# Patient Record
Sex: FEMALE | Race: BLACK OR AFRICAN AMERICAN | NOT HISPANIC OR LATINO | Employment: OTHER | URBAN - METROPOLITAN AREA
[De-identification: names, ages, dates, MRNs, and addresses within clinical notes are randomized per-mention and may not be internally consistent; named-entity substitution may affect disease eponyms.]

---

## 2017-04-26 ENCOUNTER — OFFICE VISIT (OUTPATIENT)
Dept: OPHTHALMOLOGY | Facility: CLINIC | Age: 62
End: 2017-04-26
Payer: COMMERCIAL

## 2017-04-26 DIAGNOSIS — H40.1131 PRIMARY OPEN ANGLE GLAUCOMA OF BOTH EYES, MILD STAGE: Primary | ICD-10-CM

## 2017-04-26 DIAGNOSIS — H25.13 NUCLEAR SCLEROSIS, BILATERAL: ICD-10-CM

## 2017-04-26 DIAGNOSIS — H52.7 REFRACTIVE ERROR: ICD-10-CM

## 2017-04-26 PROCEDURE — 92250 FUNDUS PHOTOGRAPHY W/I&R: CPT | Mod: S$GLB,,, | Performed by: OPHTHALMOLOGY

## 2017-04-26 PROCEDURE — 92014 COMPRE OPH EXAM EST PT 1/>: CPT | Mod: S$GLB,,, | Performed by: OPHTHALMOLOGY

## 2017-04-26 PROCEDURE — 99999 PR PBB SHADOW E&M-EST. PATIENT-LVL II: CPT | Mod: PBBFAC,,, | Performed by: OPHTHALMOLOGY

## 2017-04-26 PROCEDURE — 92083 EXTENDED VISUAL FIELD XM: CPT | Mod: S$GLB,,, | Performed by: OPHTHALMOLOGY

## 2017-04-26 PROCEDURE — 92015 DETERMINE REFRACTIVE STATE: CPT | Mod: S$GLB,,, | Performed by: OPHTHALMOLOGY

## 2017-04-26 RX ORDER — ASPIRIN 81 MG/1
81 TABLET ORAL DAILY
COMMUNITY

## 2017-04-26 NOTE — MR AVS SNAPSHOT
[facility]  [address] [facility] [place] [address]  Phone: [phone]  Fax: [phone]                  Len Hicks   2017 1:15 PM   Office Visit    Description:  Female : 1955   Provider:  Gian Page MD   Department:  [facility] - Ophthalmology           Reason for Visit     Glaucoma           Diagnoses this Visit        Comments    Primary open angle glaucoma of both eyes, mild stage    -  Primary     Nuclear sclerosis, bilateral         Refractive error                To Do List           Goals (5 Years of Data)     None      [facility]     [facility] -  Assistance  Unless otherwise directed by your provider, please contact [facility], our nurse care line that is available for  assistance.     Registered nurses in the [facility] Center provide: appointment scheduling, clinical advisement, health education, and other advisory services.  Call: [phone] (toll free)               Medications           Message regarding Medications     Verify the changes and/or additions to your medication regime listed below are the same as discussed with your clinician today.  If any of these changes or additions are incorrect, please notify your healthcare provider.             Verify that the below list of medications is an accurate representation of the medications you are currently taking.  If none reported, the list may be blank. If incorrect, please contact your healthcare provider. Carry this list with you in case of emergency.           Current Medications     amlodipine (NORVASC) 10 MG tablet     aspirin (ECOTRIN) 81 MG EC tablet Take 81 mg by mouth once daily.    CALCIUM ACETATE ORAL Take by mouth.    clopidogrel (PLAVIX) 75 mg tablet TK 1 T PO  D    CRESTOR 20 mg tablet     LUMIGAN 0.01 % Drop INSTILL 1 DROP IN OU HS    metoprolol tartrate (LOPRESSOR) 25 MG tablet TK 1 T PO  BID    timolol maleate 0.5% (TIMOPTIC) 0.5 % Drop  INSTILL 1 DROP INTO EACH EYE EVERY MORNING    tramadol (ULTRAM) 50 mg tablet TK 1 T PO  Q 6 TO 8 H PRN    valsartan-hydrochlorothiazide (DIOVAN-HCT) 320-12.5 mg per tablet TK 1 T PO D    zolpidem (AMBIEN) 10 mg Tab            Clinical Reference Information           Allergies as of 4/26/2017     Allegra-d 12 Hour [Fexofenadine-pseudoephedrine]    Augmentin [Amoxicillin-pot Clavulanate]    Robitussin [Guaifenesin]      Immunizations Administered on Date of Encounter - 4/26/2017     None      Orders Placed During Today's Visit      Normal Orders This Visit    Color Fundus Photography - OU - Both Eyes     Hawley Visual Field - OU - Extended - Both Eyes       [facility] Sign-Up     Activating your [facility] account is as easy as 1-2-3!     1) Visit [email or web address], select Sign Up Now, enter this activation code and your date of birth, then select Next.  [email or web address]  Expires: 6/10/2017  2:24 PM      2) Create a username and password to use when you visit [facility] in the future and select a security question in case you lose your password and select Next.    3) Enter your e-mail address and click Sign Up!    Additional Information  If you have questions, please e-mail [email or web address] or call [phone] to talk to our [facility] staff. Remember, [facility] is NOT to be used for urgent needs. For medical emergencies, dial 911.         Language Assistance Services     ATTENTION: Language assistance services are available, free of charge. Please call [phone].      ATENCIÓN: Si habla español, tiene a chino disposición servicios gratuitos de asistencia lingüística. Llame al [phone].     CHÚ Ý: N?u b?n nói Ti?ng Vi?t, có các d?ch v? h? tr? ngôn ng? mi?n phí dành cho b?n. G?i s? [phone].         [facility] complies with applicable Federal civil rights laws and does not discriminate on the basis of race, color, national origin, age, disability, or sex.

## 2017-04-26 NOTE — PROGRESS NOTES
SUBJECTIVE:   Len Hicks is a 61 y.o. female   Corrected distance visual acuity was 20/40 +2 in the right eye and 20/25 -2 in the left eye.   Chief Complaint   Patient presents with    Glaucoma     6 month lost to follow up, Timolol QAM OU, Lumigan QHS OU        HPI:  HPI     Glaucoma    Additional comments: 6 month lost to follow up, Timolol QAM OU, Lumigan   QHS OU           Comments   Pt states she's been having trouble seeing clearly at night, glare bothers   her. Want to update her glasses, the frames are too small for the PAL,   hard to read with the glasses. Using her drops as directed. No ocular pain   or irritation.     1. Mild Coag (Initial 24/22) goal = 16  SLT OD 1/05 (20-17)  2. NSC OU  3. Dry Eyes    Timolol QAM OU  Lumigan QHS OU       Last edited by Momo Mckeon on 4/26/2017  1:40 PM. (History)        Assessment /Plan :  1. Primary open angle glaucoma of both eyes, mild stage Doing well, IOP within acceptable range relative to target IOP and no evidence of progression. Continue current treatment. Reviewed importance of continued compliance with treatment and follow up.     2. Nuclear sclerosis, bilateral monitor for now   3.      Refractive Error PAL Rx    Return to clinic in 4 months  or as needed.  With IOP Check and GOCT

## 2017-07-09 DIAGNOSIS — H40.1194 INDETERMINATE STAGE CHRONIC OPEN ANGLE GLAUCOMA: ICD-10-CM

## 2017-07-10 RX ORDER — TIMOLOL MALEATE 5 MG/ML
SOLUTION/ DROPS OPHTHALMIC
Qty: 5 ML | Refills: 12 | Status: SHIPPED | OUTPATIENT
Start: 2017-07-10 | End: 2019-05-20 | Stop reason: SDUPTHER

## 2017-10-02 ENCOUNTER — OFFICE VISIT (OUTPATIENT)
Dept: OPHTHALMOLOGY | Facility: CLINIC | Age: 62
End: 2017-10-02
Payer: COMMERCIAL

## 2017-10-02 DIAGNOSIS — H40.1131 PRIMARY OPEN ANGLE GLAUCOMA OF BOTH EYES, MILD STAGE: Primary | ICD-10-CM

## 2017-10-02 DIAGNOSIS — H25.13 NUCLEAR SCLEROSIS, BILATERAL: ICD-10-CM

## 2017-10-02 PROCEDURE — 99999 PR PBB SHADOW E&M-EST. PATIENT-LVL I: CPT | Mod: PBBFAC,,, | Performed by: OPHTHALMOLOGY

## 2017-10-02 PROCEDURE — 92133 CPTRZD OPH DX IMG PST SGM ON: CPT | Mod: S$GLB,,, | Performed by: OPHTHALMOLOGY

## 2017-10-02 PROCEDURE — 92012 INTRM OPH EXAM EST PATIENT: CPT | Mod: S$GLB,,, | Performed by: OPHTHALMOLOGY

## 2017-10-02 NOTE — PROGRESS NOTES
SUBJECTIVE:   Len Hicks is a 62 y.o. female   Corrected distance visual acuity was 20/20 in the right eye and 20/20 in the left eye.   Chief Complaint   Patient presents with    Glaucoma     4m IOP GOCT chk; Lumigan QHS OU, Timolol BID OU        HPI:  HPI     Glaucoma    Additional comments: 4m IOP GOCT chk; Lumigan QHS OU, Timolol BID OU           Comments   Pt states no pain or discomfort. VA stable. 95% compliant with gtts.     1. Mild Coag (Initial 24/22) goal = 16  SLT OD 1/05 (20-17)  2. NSC OU  3. Dry Eyes    Timolol QAM OU  Lumigan QHS OU       Last edited by Marcellus Garcia, Patient Care Assistant on 10/2/2017 11:16   AM. (History)        Assessment /Plan :  1. Primary open angle glaucoma of both eyes, mild stage IOP not within acceptable range relative to target IOP. Increases risk of irreversible visual loss due to inadequate compliance discussed. Lengthy discussion regarding importance of absolute compliance with treatment regimen and follow up. Discussed options, risks, and benefits of additional medication, SLT laser, and/ or incisional glaucoma surgery versus improved compliance.     Pt elects to work on improving medication compliance     2. Nuclear sclerosis, bilateral          Return to clinic in 4 months  or as needed.  With IOP Check

## 2017-10-11 RX ORDER — BIMATOPROST 0.1 MG/ML
SOLUTION/ DROPS OPHTHALMIC
Qty: 5 ML | Refills: 12 | Status: SHIPPED | OUTPATIENT
Start: 2017-10-11 | End: 2019-05-20 | Stop reason: SDUPTHER

## 2018-01-25 ENCOUNTER — TELEPHONE (OUTPATIENT)
Dept: ORTHOPEDICS | Facility: CLINIC | Age: 63
End: 2018-01-25

## 2018-02-26 ENCOUNTER — OFFICE VISIT (OUTPATIENT)
Dept: OPHTHALMOLOGY | Facility: CLINIC | Age: 63
End: 2018-02-26
Payer: COMMERCIAL

## 2018-02-26 DIAGNOSIS — H25.13 NUCLEAR SCLEROSIS, BILATERAL: ICD-10-CM

## 2018-02-26 DIAGNOSIS — H40.1131 PRIMARY OPEN ANGLE GLAUCOMA OF BOTH EYES, MILD STAGE: Primary | ICD-10-CM

## 2018-02-26 PROCEDURE — 99999 PR PBB SHADOW E&M-EST. PATIENT-LVL II: CPT | Mod: PBBFAC,,, | Performed by: OPHTHALMOLOGY

## 2018-02-26 PROCEDURE — 92012 INTRM OPH EXAM EST PATIENT: CPT | Mod: S$GLB,,, | Performed by: OPHTHALMOLOGY

## 2018-02-26 NOTE — PROGRESS NOTES
SUBJECTIVE:   Len Hicks is a 62 y.o. female   Corrected distance visual acuity was 20/20 -1 in the right eye and 20/25 -2 in the left eye.   Chief Complaint   Patient presents with    Glaucoma     4 mth iop check        HPI:  HPI     Glaucoma    Additional comments: 4 mth iop check           Comments   1. Mild Coag (Initial 24/22) goal = 16  SLT OD 1/05 (20-17)  2. NSC OU  3. Dry Eyes    Timolol QAM OU  Lumigan QHS OU       Last edited by Cassandra Kumar MA on 2/26/2018 11:05 AM. (History)        Assessment /Plan :  1. Primary open angle glaucoma of both eyes, mild stage Doing well, IOP within acceptable range relative to target IOP and no evidence of progression. Continue current treatment. Reviewed importance of continued compliance with treatment and follow up.     2. Nuclear sclerosis, bilateral  - not visually significant. Observe.       Return to clinic in 4 months  or as needed.  With 24-2 HVF, Dilation and SDP's

## 2018-08-16 ENCOUNTER — OFFICE VISIT (OUTPATIENT)
Dept: OPHTHALMOLOGY | Facility: CLINIC | Age: 63
End: 2018-08-16
Payer: COMMERCIAL

## 2018-08-16 DIAGNOSIS — H40.1131 PRIMARY OPEN ANGLE GLAUCOMA (POAG) OF BOTH EYES, MILD STAGE: Primary | ICD-10-CM

## 2018-08-16 DIAGNOSIS — H25.13 NUCLEAR SCLEROSIS, BILATERAL: ICD-10-CM

## 2018-08-16 PROCEDURE — 92014 COMPRE OPH EXAM EST PT 1/>: CPT | Mod: S$GLB,,, | Performed by: OPHTHALMOLOGY

## 2018-08-16 PROCEDURE — 99999 PR PBB SHADOW E&M-EST. PATIENT-LVL II: CPT | Mod: PBBFAC,,, | Performed by: OPHTHALMOLOGY

## 2018-08-16 PROCEDURE — 92083 EXTENDED VISUAL FIELD XM: CPT | Mod: S$GLB,,, | Performed by: OPHTHALMOLOGY

## 2018-08-16 PROCEDURE — 92250 FUNDUS PHOTOGRAPHY W/I&R: CPT | Mod: S$GLB,,, | Performed by: OPHTHALMOLOGY

## 2018-08-16 NOTE — PROGRESS NOTES
SUBJECTIVE:   Len Hicks is a 63 y.o. female   Corrected distance visual acuity was 20/25 in the right eye and 20/20 in the left eye.   Chief Complaint   Patient presents with    Glaucoma     4m HVF SDP chk        HPI:  HPI     Glaucoma      Additional comments: 4m HVF SDP chk              Comments     Pt here for 4m HVF SDP chk. No pain or discomfort. VA stable. Pt states   that she is a lot more compliant with the Timolol than the Lumigan. She   thinks she is about 50% compliant with the Lumigan.      1. Mild Coag (Initial 24/22) goal = 16  SLT OD 1/05 (20-17)  2. NSC OU  3. Dry Eyes    Timolol QAM OU  Lumigan QHS OU          Last edited by Marcellus Garcia, Patient Care Assistant on 8/16/2018  1:41   PM. (History)        Assessment /Plan :  1. Primary open angle glaucoma (POAG) of both eyes, mild stage IOP not within acceptable range relative to target IOP with risk of irreversible visual loss. Better IOP control is recommended. Discussed options, risks, and benefits of additional medication, SLT laser, and/or incisional glaucoma surgery. Reviewed importance of continued compliance with treatment and follow up.     Patient chooses work on improving medication compliance and defer and recheck IOP next visit  If IOP is still increased may consider the SLT   2. Nuclear sclerosis, bilateral  - not visually significant. Observe.       Return to clinic in 3-4 months  or as needed.  With IOP Check and GOCT

## 2019-05-20 ENCOUNTER — OFFICE VISIT (OUTPATIENT)
Dept: OPHTHALMOLOGY | Facility: CLINIC | Age: 64
End: 2019-05-20
Payer: COMMERCIAL

## 2019-05-20 DIAGNOSIS — H40.1194 INDETERMINATE STAGE CHRONIC OPEN ANGLE GLAUCOMA: ICD-10-CM

## 2019-05-20 DIAGNOSIS — H40.1131 PRIMARY OPEN ANGLE GLAUCOMA (POAG) OF BOTH EYES, MILD STAGE: Primary | ICD-10-CM

## 2019-05-20 PROCEDURE — 92012 PR EYE EXAM, EST PATIENT,INTERMED: ICD-10-PCS | Mod: S$GLB,,, | Performed by: OPHTHALMOLOGY

## 2019-05-20 PROCEDURE — 92133 CPTRZD OPH DX IMG PST SGM ON: CPT | Mod: S$GLB,,, | Performed by: OPHTHALMOLOGY

## 2019-05-20 PROCEDURE — 92133 POSTERIOR SEGMENT OCT OPTIC NERVE(OCULAR COHERENCE TOMOGRAPHY) - OU - BOTH EYES: ICD-10-PCS | Mod: S$GLB,,, | Performed by: OPHTHALMOLOGY

## 2019-05-20 PROCEDURE — 99999 PR PBB SHADOW E&M-EST. PATIENT-LVL I: ICD-10-PCS | Mod: PBBFAC,,, | Performed by: OPHTHALMOLOGY

## 2019-05-20 PROCEDURE — 99999 PR PBB SHADOW E&M-EST. PATIENT-LVL I: CPT | Mod: PBBFAC,,, | Performed by: OPHTHALMOLOGY

## 2019-05-20 PROCEDURE — 92012 INTRM OPH EXAM EST PATIENT: CPT | Mod: S$GLB,,, | Performed by: OPHTHALMOLOGY

## 2019-05-20 RX ORDER — PHENTERMINE HYDROCHLORIDE 37.5 MG/1
TABLET ORAL
Refills: 2 | COMMUNITY
Start: 2019-05-09 | End: 2020-01-28

## 2019-05-20 RX ORDER — TOPIRAMATE 25 MG/1
TABLET ORAL
Refills: 2 | COMMUNITY
Start: 2019-05-09 | End: 2020-01-28

## 2019-05-20 NOTE — PROGRESS NOTES
SUBJECTIVE:   Len Hicks is a 63 y.o. female   Corrected distance visual acuity was 20/20 in the right eye and 20/25 in the left eye.   Chief Complaint   Patient presents with    Glaucoma        HPI:  HPI     Pt here for GOCT IOP chk. No pain or discomfort. VA stable. 100%   compliant with gtts. Pt needs refills on her gtts. Pt ran out of her   Lumigan within the last couple of months.     1. Mild Coag (Initial 24/22) goal = 16  SLT OD 1/05 (20-17)  2. NSC OU  3. Dry Eyes    Timolol QAM OU  Lumigan 0.03% QHS OU    Last edited by Marcellus Garcia, Patient Care Assistant on 5/20/2019  9:38   AM. (History)        Assessment /Plan :  1. Primary open angle glaucoma (POAG) of both eyes, mild stage - IOP not within acceptable range relative to target IOP. Increases risk of irreversible visual loss due to inadequate compliance discussed. Lengthy discussion regarding importance of absolute compliance with treatment regimen and follow up. Discussed options, risks, and benefits of additional medication, SLT laser, and/ or incisional glaucoma surgery versus improved compliance.     Pt elects to work on improving medication compliance     Resume Lumigan QHS OU  Continue Timolol QAM OU    Return to clinic in 6 weeks HVF and DOA  or as needed

## 2019-05-21 RX ORDER — TIMOLOL MALEATE 5 MG/ML
SOLUTION/ DROPS OPHTHALMIC
Qty: 5 ML | Refills: 12 | Status: SHIPPED | OUTPATIENT
Start: 2019-05-21 | End: 2020-05-26 | Stop reason: SDUPTHER

## 2019-09-16 ENCOUNTER — OFFICE VISIT (OUTPATIENT)
Dept: OPHTHALMOLOGY | Facility: CLINIC | Age: 64
End: 2019-09-16
Payer: COMMERCIAL

## 2019-09-16 DIAGNOSIS — H40.1131 PRIMARY OPEN ANGLE GLAUCOMA (POAG) OF BOTH EYES, MILD STAGE: Primary | ICD-10-CM

## 2019-09-16 DIAGNOSIS — H25.13 NUCLEAR SCLEROSIS, BILATERAL: ICD-10-CM

## 2019-09-16 PROCEDURE — 92014 PR EYE EXAM, EST PATIENT,COMPREHESV: ICD-10-PCS | Mod: S$GLB,,, | Performed by: OPHTHALMOLOGY

## 2019-09-16 PROCEDURE — 92083 EXTENDED VISUAL FIELD XM: CPT | Mod: S$GLB,,, | Performed by: OPHTHALMOLOGY

## 2019-09-16 PROCEDURE — 99999 PR PBB SHADOW E&M-EST. PATIENT-LVL II: CPT | Mod: PBBFAC,,, | Performed by: OPHTHALMOLOGY

## 2019-09-16 PROCEDURE — 92083 HUMPHREY VISUAL FIELD - OU - BOTH EYES: ICD-10-PCS | Mod: S$GLB,,, | Performed by: OPHTHALMOLOGY

## 2019-09-16 PROCEDURE — 92250 FUNDUS PHOTOGRAPHY W/I&R: CPT | Mod: S$GLB,,, | Performed by: OPHTHALMOLOGY

## 2019-09-16 PROCEDURE — 92014 COMPRE OPH EXAM EST PT 1/>: CPT | Mod: S$GLB,,, | Performed by: OPHTHALMOLOGY

## 2019-09-16 PROCEDURE — 92250 COLOR FUNDUS PHOTOGRAPHY - OU - BOTH EYES: ICD-10-PCS | Mod: S$GLB,,, | Performed by: OPHTHALMOLOGY

## 2019-09-16 PROCEDURE — 99999 PR PBB SHADOW E&M-EST. PATIENT-LVL II: ICD-10-PCS | Mod: PBBFAC,,, | Performed by: OPHTHALMOLOGY

## 2019-09-16 RX ORDER — IRBESARTAN AND HYDROCHLOROTHIAZIDE 300; 12.5 MG/1; MG/1
TABLET, FILM COATED ORAL
Refills: 11 | COMMUNITY
Start: 2019-06-10

## 2019-09-16 RX ORDER — NITROGLYCERIN 0.4 MG/1
TABLET SUBLINGUAL
Refills: 6 | COMMUNITY
Start: 2019-09-06 | End: 2020-03-05

## 2019-09-16 NOTE — PROGRESS NOTES
SUBJECTIVE:   Len Hicks is a 64 y.o. female   Corrected distance visual acuity was 20/20 in the right eye and 20/20 -1 in the left eye.   Chief Complaint   Patient presents with    Glaucoma     6 week HVF/FD, Timolol QAM OU, Lumigan 0.03 QHS OU        HPI:  HPI     Glaucoma      Additional comments: 6 week HVF/FD, Timolol QAM OU, Lumigan 0.03 QHS OU              Comments     Pt states she's been compliant with her drops. She's been having a FBS in   her left eye, feels like a lash is under the lid but nothing's there.   She's only using her glaucoma drops, no tears. No ocular pain or   irritation. Vision is about the same. Doing well with her glasses.     1. Mild Coag (Initial 24/22) goal = 16  SLT OD 1/05 (20-17)  2. NSC OU  3. Dry Eyes    Timolol QAM OU  Lumigan 0.03% QHS OU          Last edited by Momo Mckeon on 9/16/2019  1:09 PM. (History)        Assessment /Plan :  1. Primary open angle glaucoma (POAG) of both eyes, mild stage Doing well, IOP within acceptable range relative to target IOP and no evidence of progression. Continue current treatment. Reviewed importance of continued compliance with treatment and follow up.     2. Nuclear sclerosis, bilateral monitor for now     Return to clinic in 4 months  or as needed.  With IOP Check and GOCT

## 2020-05-26 DIAGNOSIS — H40.1194 INDETERMINATE STAGE CHRONIC OPEN ANGLE GLAUCOMA: ICD-10-CM

## 2020-05-26 RX ORDER — TIMOLOL MALEATE 5 MG/ML
SOLUTION/ DROPS OPHTHALMIC
Qty: 5 ML | Refills: 12 | Status: SHIPPED | OUTPATIENT
Start: 2020-05-26 | End: 2020-09-14

## 2021-09-01 ENCOUNTER — TELEPHONE (OUTPATIENT)
Dept: OPHTHALMOLOGY | Facility: CLINIC | Age: 66
End: 2021-09-01

## 2021-09-01 DIAGNOSIS — H40.1194 INDETERMINATE STAGE CHRONIC OPEN ANGLE GLAUCOMA: ICD-10-CM

## 2021-09-01 RX ORDER — TIMOLOL MALEATE 5 MG/ML
1 SOLUTION/ DROPS OPHTHALMIC EVERY MORNING
Qty: 5 ML | Refills: 1 | Status: SHIPPED | OUTPATIENT
Start: 2021-09-01 | End: 2022-03-22 | Stop reason: SDUPTHER

## 2021-10-04 ENCOUNTER — TELEPHONE (OUTPATIENT)
Dept: OPHTHALMOLOGY | Facility: CLINIC | Age: 66
End: 2021-10-04

## 2021-12-27 ENCOUNTER — TELEPHONE (OUTPATIENT)
Dept: OPHTHALMOLOGY | Facility: CLINIC | Age: 66
End: 2021-12-27
Payer: COMMERCIAL

## 2022-03-22 ENCOUNTER — OFFICE VISIT (OUTPATIENT)
Dept: OPHTHALMOLOGY | Facility: CLINIC | Age: 67
End: 2022-03-22
Payer: COMMERCIAL

## 2022-03-22 DIAGNOSIS — H40.1131 CHRONIC OPEN ANGLE GLAUCOMA OF BOTH EYES, MILD STAGE: Primary | ICD-10-CM

## 2022-03-22 DIAGNOSIS — H25.13 NUCLEAR SCLEROSIS OF BOTH EYES: ICD-10-CM

## 2022-03-22 PROCEDURE — 99999 PR PBB SHADOW E&M-EST. PATIENT-LVL III: CPT | Mod: PBBFAC,,, | Performed by: OPHTHALMOLOGY

## 2022-03-22 PROCEDURE — 92083 EXTENDED VISUAL FIELD XM: CPT | Mod: S$GLB,,, | Performed by: OPHTHALMOLOGY

## 2022-03-22 PROCEDURE — 92133 CPTRZD OPH DX IMG PST SGM ON: CPT | Mod: S$GLB,,, | Performed by: OPHTHALMOLOGY

## 2022-03-22 PROCEDURE — 99214 PR OFFICE/OUTPT VISIT, EST, LEVL IV, 30-39 MIN: ICD-10-PCS | Mod: S$GLB,,, | Performed by: OPHTHALMOLOGY

## 2022-03-22 PROCEDURE — 1159F MED LIST DOCD IN RCRD: CPT | Mod: CPTII,S$GLB,, | Performed by: OPHTHALMOLOGY

## 2022-03-22 PROCEDURE — 99214 OFFICE O/P EST MOD 30 MIN: CPT | Mod: S$GLB,,, | Performed by: OPHTHALMOLOGY

## 2022-03-22 PROCEDURE — 99999 PR PBB SHADOW E&M-EST. PATIENT-LVL III: ICD-10-PCS | Mod: PBBFAC,,, | Performed by: OPHTHALMOLOGY

## 2022-03-22 PROCEDURE — 1160F RVW MEDS BY RX/DR IN RCRD: CPT | Mod: CPTII,S$GLB,, | Performed by: OPHTHALMOLOGY

## 2022-03-22 PROCEDURE — 1160F PR REVIEW ALL MEDS BY PRESCRIBER/CLIN PHARMACIST DOCUMENTED: ICD-10-PCS | Mod: CPTII,S$GLB,, | Performed by: OPHTHALMOLOGY

## 2022-03-22 PROCEDURE — 92083 HUMPHREY VISUAL FIELD - OU - BOTH EYES: ICD-10-PCS | Mod: S$GLB,,, | Performed by: OPHTHALMOLOGY

## 2022-03-22 PROCEDURE — 1159F PR MEDICATION LIST DOCUMENTED IN MEDICAL RECORD: ICD-10-PCS | Mod: CPTII,S$GLB,, | Performed by: OPHTHALMOLOGY

## 2022-03-22 PROCEDURE — 92133 POSTERIOR SEGMENT OCT OPTIC NERVE(OCULAR COHERENCE TOMOGRAPHY) - OU - BOTH EYES: ICD-10-PCS | Mod: S$GLB,,, | Performed by: OPHTHALMOLOGY

## 2022-03-22 RX ORDER — SEMAGLUTIDE 1.34 MG/ML
INJECTION, SOLUTION SUBCUTANEOUS
COMMUNITY
Start: 2022-03-16

## 2022-03-22 RX ORDER — BIMATOPROST 0.3 MG/ML
1 SOLUTION/ DROPS OPHTHALMIC NIGHTLY
Qty: 2.5 ML | Refills: 12 | Status: SHIPPED | OUTPATIENT
Start: 2022-03-22 | End: 2023-02-14 | Stop reason: SDUPTHER

## 2022-03-22 RX ORDER — TIMOLOL MALEATE 5 MG/ML
1 SOLUTION/ DROPS OPHTHALMIC EVERY MORNING
Qty: 5 ML | Refills: 12 | Status: SHIPPED | OUTPATIENT
Start: 2022-03-22 | End: 2023-02-14 | Stop reason: SDUPTHER

## 2022-03-22 NOTE — PROGRESS NOTES
SUBJECTIVE  Olejeimy Hicks is 66 y.o. female  Corrected distance visual acuity was 20/20 -2 in the right eye and 20/20 in the left eye.   Chief Complaint   Patient presents with    Glaucoma     Pt here for lost to f/u hvf goct chk. No pain or discomfort. Pt says her eyes have been watering and itching more recently. VA stable. Pt says she has been compliant with gtts. Pt says she needs a new prescription for her eye drops          HPI     Glaucoma      Additional comments: Pt here for lost to f/u hvf goct chk. No pain or   discomfort. Pt says her eyes have been watering and itching more recently.   VA stable. Pt says she has been compliant with gtts. Pt says she needs a   new prescription for her eye drops              Comments     1. Mild Coag (Initial 24/22) goal = 16 +Fhx (niece)  SLT OD 1/05 (20-17)  2. NSC OU  3. Dry Eyes    Timolol QAM OU  Lumigan 0.03% QHS OU            Last edited by Marcellus Garcia MA on 3/22/2022  8:10 AM. (History)         Assessment /Plan :  1. Chronic open angle glaucoma of both eyes, mild stage IOP not within acceptable range relative to target IOP with risk of irreversible visual loss. Better IOP control is recommended. Discussed options, risks, and benefits of additional medication, SLT laser, and/or incisional glaucoma surgery. Reviewed importance of continued compliance with treatment and follow up.     Patient chooses defer and recheck IOP next visit      Patient instructed to continue using the following glaucoma medication as follows:  Timolol one drop both eyes daily and Bimatoprost (Lumigan) one drop in each eye nightly    Return to clinic in 4 months  or as needed.  With Dilation       2. Nuclear sclerosis of both eyes - monitor for now

## 2022-07-26 ENCOUNTER — OFFICE VISIT (OUTPATIENT)
Dept: OPHTHALMOLOGY | Facility: CLINIC | Age: 67
End: 2022-07-26
Payer: MEDICARE

## 2022-07-26 DIAGNOSIS — H40.1131 CHRONIC OPEN ANGLE GLAUCOMA OF BOTH EYES, MILD STAGE: Primary | ICD-10-CM

## 2022-07-26 DIAGNOSIS — H25.13 NUCLEAR SCLEROSIS OF BOTH EYES: ICD-10-CM

## 2022-07-26 PROCEDURE — 99999 PR PBB SHADOW E&M-EST. PATIENT-LVL III: CPT | Mod: PBBFAC,,, | Performed by: OPHTHALMOLOGY

## 2022-07-26 PROCEDURE — 99999 PR PBB SHADOW E&M-EST. PATIENT-LVL III: ICD-10-PCS | Mod: PBBFAC,,, | Performed by: OPHTHALMOLOGY

## 2022-07-26 PROCEDURE — 99213 OFFICE O/P EST LOW 20 MIN: CPT | Mod: PBBFAC | Performed by: OPHTHALMOLOGY

## 2022-07-26 PROCEDURE — 92014 PR EYE EXAM, EST PATIENT,COMPREHESV: ICD-10-PCS | Mod: S$PBB,,, | Performed by: OPHTHALMOLOGY

## 2022-07-26 PROCEDURE — 92014 COMPRE OPH EXAM EST PT 1/>: CPT | Mod: S$PBB,,, | Performed by: OPHTHALMOLOGY

## 2022-07-26 NOTE — PROGRESS NOTES
SUBJECTIVE  Len Hicks is 67 y.o. female  Corrected distance visual acuity was 20/20 -2 in the right eye and 20/25 in the left eye.   Chief Complaint   Patient presents with    Glaucoma     Pt reports for 4m dil exam. Denies any pain or irritation. Va stable. 100% compliant with gtts.           HPI     Glaucoma      Additional comments: Pt reports for 4m dil exam. Denies any pain or   irritation. Va stable. 100% compliant with gtts.               Comments       1. Mild Coag (Initial 24/22) goal = 16 +Fhx (niece)  SLT OD 1/05 (20-17)  2. NSC OU  3. Dry Eyes    Timolol QAM OU  Lumigan 0.03% QHS OU            Last edited by Martínez Varghese on 7/26/2022  8:24 AM. (History)         Assessment /Plan :  1. Chronic open angle glaucoma of both eyes, mild stage Doing well, intraocular pressure (IOP) within acceptable range relative to target IOP and no evidence of progression. Continue current treatment. Reviewed importance of continued compliance with treatment and follow up.      Patient instructed to continue using the following glaucoma medication as follows:  Timolol one drop both eyes daily and Bimatoprost (Lumigan) one drop in each eye nightly    Return to clinic in 4 months  or as needed.  With IOP Check and GOCT       2. Nuclear sclerosis of both eyes - monitor for now

## 2023-02-14 ENCOUNTER — OFFICE VISIT (OUTPATIENT)
Dept: OPHTHALMOLOGY | Facility: CLINIC | Age: 68
End: 2023-02-14
Payer: MEDICARE

## 2023-02-14 DIAGNOSIS — H25.13 NUCLEAR SCLEROSIS OF BOTH EYES: ICD-10-CM

## 2023-02-14 DIAGNOSIS — H40.1131 CHRONIC OPEN ANGLE GLAUCOMA OF BOTH EYES, MILD STAGE: Primary | ICD-10-CM

## 2023-02-14 PROCEDURE — 99214 OFFICE O/P EST MOD 30 MIN: CPT | Mod: S$PBB,,, | Performed by: OPHTHALMOLOGY

## 2023-02-14 PROCEDURE — 92133 POSTERIOR SEGMENT OCT OPTIC NERVE(OCULAR COHERENCE TOMOGRAPHY) - OU - BOTH EYES: ICD-10-PCS | Mod: 26,S$PBB,, | Performed by: OPHTHALMOLOGY

## 2023-02-14 PROCEDURE — 92133 CPTRZD OPH DX IMG PST SGM ON: CPT | Mod: PBBFAC | Performed by: OPHTHALMOLOGY

## 2023-02-14 PROCEDURE — 99999 PR PBB SHADOW E&M-EST. PATIENT-LVL III: ICD-10-PCS | Mod: PBBFAC,,, | Performed by: OPHTHALMOLOGY

## 2023-02-14 PROCEDURE — 99214 PR OFFICE/OUTPT VISIT, EST, LEVL IV, 30-39 MIN: ICD-10-PCS | Mod: S$PBB,,, | Performed by: OPHTHALMOLOGY

## 2023-02-14 PROCEDURE — 99213 OFFICE O/P EST LOW 20 MIN: CPT | Mod: PBBFAC | Performed by: OPHTHALMOLOGY

## 2023-02-14 PROCEDURE — 99999 PR PBB SHADOW E&M-EST. PATIENT-LVL III: CPT | Mod: PBBFAC,,, | Performed by: OPHTHALMOLOGY

## 2023-02-14 RX ORDER — TIMOLOL MALEATE 5 MG/ML
1 SOLUTION/ DROPS OPHTHALMIC EVERY MORNING
Qty: 5 ML | Refills: 12 | Status: SHIPPED | OUTPATIENT
Start: 2023-02-14

## 2023-02-14 RX ORDER — BIMATOPROST 0.3 MG/ML
1 SOLUTION/ DROPS OPHTHALMIC NIGHTLY
Qty: 2.5 ML | Refills: 12 | Status: SHIPPED | OUTPATIENT
Start: 2023-02-14

## 2023-02-14 NOTE — PROGRESS NOTES
SUBJECTIVE  Len Hicks is 67 y.o. female  Corrected distance visual acuity was 20/30 -2 in the right eye and 20/25 -1 in the left eye.   Chief Complaint   Patient presents with    Glaucoma     Pt reports for 4m IOP / GOCT. Denies any pain or irritation. Va stable. 100% compliant with gtts.           HPI     Glaucoma     Additional comments: Pt reports for 4m IOP / GOCT. Denies any pain or   irritation. Va stable. 100% compliant with gtts.            Comments      1. Mild Coag (Initial 24/22) goal = 16 +Fhx (niece)  SLT OD 1/05 (20-17)  2. NSC OU  3. Dry Eyes    Timolol QAM OU  Lumigan 0.03% QHS OU            Last edited by Martínez Varghese on 2/14/2023 10:57 AM.         Assessment /Plan :  1. Chronic open angle glaucoma of both eyes, mild stage Intraocular pressure (IOP) not within acceptable range relative to target IOP with risk of irreversible visual loss. Better IOP control is recommended. Treatment options may include change or additional medications, Selective Laser Trabeculoplasty (SLT laser), and/or incisional glaucoma surgery. Reviewed importance of continued compliance with treatment and follow up.     Patient chooses defer and recheck IOP next visit  Continue Timolol one drop in each eye every morning and Lumigan one drop in each eye every night    Return to clinic in 3-4 months  or as needed.  With Dilation and HVF 24-2     2. Nuclear sclerosis of both eyes - monitor for now

## 2023-06-27 ENCOUNTER — TELEPHONE (OUTPATIENT)
Dept: PHARMACY | Facility: CLINIC | Age: 68
End: 2023-06-27
Payer: MEDICARE

## 2023-08-15 ENCOUNTER — OFFICE VISIT (OUTPATIENT)
Dept: OPHTHALMOLOGY | Facility: CLINIC | Age: 68
End: 2023-08-15
Payer: MEDICARE

## 2023-08-15 DIAGNOSIS — H25.13 NUCLEAR SCLEROSIS OF BOTH EYES: ICD-10-CM

## 2023-08-15 DIAGNOSIS — H40.1131 CHRONIC OPEN ANGLE GLAUCOMA OF BOTH EYES, MILD STAGE: Primary | ICD-10-CM

## 2023-08-15 PROCEDURE — 92083 EXTENDED VISUAL FIELD XM: CPT | Mod: PBBFAC | Performed by: OPHTHALMOLOGY

## 2023-08-15 PROCEDURE — 92083 HUMPHREY VISUAL FIELD - OU - BOTH EYES: ICD-10-PCS | Mod: 26,S$PBB,, | Performed by: OPHTHALMOLOGY

## 2023-08-15 PROCEDURE — 99999 PR PBB SHADOW E&M-EST. PATIENT-LVL III: ICD-10-PCS | Mod: PBBFAC,,, | Performed by: OPHTHALMOLOGY

## 2023-08-15 PROCEDURE — 99213 OFFICE O/P EST LOW 20 MIN: CPT | Mod: PBBFAC | Performed by: OPHTHALMOLOGY

## 2023-08-15 PROCEDURE — 92014 COMPRE OPH EXAM EST PT 1/>: CPT | Mod: S$PBB,,, | Performed by: OPHTHALMOLOGY

## 2023-08-15 PROCEDURE — 92133 CPTRZD OPH DX IMG PST SGM ON: CPT | Mod: PBBFAC | Performed by: OPHTHALMOLOGY

## 2023-08-15 PROCEDURE — 92133 POSTERIOR SEGMENT OCT OPTIC NERVE(OCULAR COHERENCE TOMOGRAPHY) - OU - BOTH EYES: ICD-10-PCS | Mod: 26,S$PBB,, | Performed by: OPHTHALMOLOGY

## 2023-08-15 PROCEDURE — 92014 PR EYE EXAM, EST PATIENT,COMPREHESV: ICD-10-PCS | Mod: S$PBB,,, | Performed by: OPHTHALMOLOGY

## 2023-08-15 PROCEDURE — 99999 PR PBB SHADOW E&M-EST. PATIENT-LVL III: CPT | Mod: PBBFAC,,, | Performed by: OPHTHALMOLOGY

## 2023-08-15 NOTE — PROGRESS NOTES
SUBJECTIVE  Len Hicks is 68 y.o. female  Uncorrected distance visual acuity was 20/30 +1 in the right eye and 20/40 in the left eye.   Chief Complaint   Patient presents with    Glaucoma     Patient here for  3-4 months With Dilation and HVF 24-2. Patient using  Timolol QAM ,  OU,Lumigan 0.03% QHS OU. Patient is 100% compliant with eye drops. Patient denies pain and discomfort.                HPI     Glaucoma     Additional comments: Patient here for  3-4 months With Dilation and HVF   24-2. Patient using  Timolol QAM ,  OU,Lumigan 0.03% QHS OU. Patient is   100% compliant with eye drops. Patient denies pain and discomfort.                 Comments    Likes  AM  Referred her sisters Robyn Bhatti and Jennifer Mckee    1. Mild Coag (Initial 24/22) goal = 16 +Fhx (niece)  SLT OD 1/05 (20-17)  2. NSC OU  3. Dry Eyes    Timolol QAM OU  Lumigan 0.03% QHS OU          Last edited by Shivani Quiros on 8/15/2023  9:09 AM.         Assessment /Plan :  1. Chronic open angle glaucoma of both eyes, mild stage Intraocular pressure (IOP) not within acceptable range relative to target IOP with risk of irreversible visual loss. Better IOP control is recommended. Treatment options may include change or additional medications, Selective Laser Trabeculoplasty (SLT laser), and/or incisional glaucoma surgery. Reviewed importance of continued compliance with treatment and follow up.     Patient chooses work on improving medication compliance and defer and recheck IOP next visit  Continue Lumigan one drop in each eye every night. Increase Timolol to one drop in each eye 2 times a day.  Return to clinic in 2 months  or as needed.  With IOP Check     2. Nuclear sclerosis of both eyes - monitor for now

## 2023-12-05 ENCOUNTER — OFFICE VISIT (OUTPATIENT)
Dept: OPHTHALMOLOGY | Facility: CLINIC | Age: 68
End: 2023-12-05
Payer: MEDICARE

## 2023-12-05 DIAGNOSIS — H25.13 NUCLEAR SCLEROSIS OF BOTH EYES: ICD-10-CM

## 2023-12-05 DIAGNOSIS — H40.1131 CHRONIC OPEN ANGLE GLAUCOMA OF BOTH EYES, MILD STAGE: Primary | ICD-10-CM

## 2023-12-05 PROCEDURE — 99214 PR OFFICE/OUTPT VISIT, EST, LEVL IV, 30-39 MIN: ICD-10-PCS | Mod: S$PBB,,, | Performed by: OPHTHALMOLOGY

## 2023-12-05 PROCEDURE — 99214 OFFICE O/P EST MOD 30 MIN: CPT | Mod: S$PBB,,, | Performed by: OPHTHALMOLOGY

## 2023-12-05 PROCEDURE — 99999 PR PBB SHADOW E&M-EST. PATIENT-LVL III: CPT | Mod: PBBFAC,,, | Performed by: OPHTHALMOLOGY

## 2023-12-05 PROCEDURE — 99999 PR PBB SHADOW E&M-EST. PATIENT-LVL III: ICD-10-PCS | Mod: PBBFAC,,, | Performed by: OPHTHALMOLOGY

## 2023-12-05 PROCEDURE — 99213 OFFICE O/P EST LOW 20 MIN: CPT | Mod: PBBFAC | Performed by: OPHTHALMOLOGY

## 2023-12-05 NOTE — PROGRESS NOTES
SUBJECTIVE  Len Hicks is 68 y.o. female  Corrected distance visual acuity was 20/30 -2 in the right eye and 20/25 -1 in the left eye.   Chief Complaint   Patient presents with    Glaucoma     Pt here for 2m IOP chk. No pain or discomfort. VA stable. Pt has been using Timolol QAM. 100% compliant with Lumigan.           HPI     Glaucoma     Additional comments: Pt here for 2m IOP chk. No pain or discomfort. VA   stable. Pt has been using Timolol QAM. 100% compliant with Lumigan.            Comments    1. Mild Coag (Initial 24/22) goal = 16 +Fhx (niece)  SLT OD 1/05 (20-17)  2. NSC OU  3. Dry Eyes    Timolol BID OU  Lumigan 0.03% QHS OU             Last edited by Marcellus Garcia MA on 12/5/2023  9:58 AM.         Assessment /Plan :  1. Chronic open angle glaucoma of both eyes, mild stage Doing well, intraocular pressure (IOP) within acceptable range relative to target IOP and no evidence of progression. Continue current treatment. Reviewed importance of continued compliance with treatment and follow up.      Patient instructed to continue using the following glaucoma medication as follows:  Timolol one drop in each eye every 12 hours and Bimatoprost (Lumigan) one drop in each eye nightly    Return to clinic in 4 months  or as needed.  With IOP Check and GOCT     2. Nuclear sclerosis of both eyes - monitor for now

## 2024-04-15 DIAGNOSIS — H40.1131 CHRONIC OPEN ANGLE GLAUCOMA OF BOTH EYES, MILD STAGE: ICD-10-CM

## 2024-04-15 RX ORDER — BIMATOPROST 0.3 MG/ML
1 SOLUTION/ DROPS OPHTHALMIC NIGHTLY
Qty: 2.5 ML | Refills: 12 | Status: SHIPPED | OUTPATIENT
Start: 2024-04-15 | End: 2024-04-16 | Stop reason: SDUPTHER

## 2024-04-15 RX ORDER — TIMOLOL MALEATE 5 MG/ML
1 SOLUTION/ DROPS OPHTHALMIC EVERY MORNING
Qty: 5 ML | Refills: 12 | Status: SHIPPED | OUTPATIENT
Start: 2024-04-15 | End: 2024-04-16 | Stop reason: SDUPTHER

## 2024-04-16 ENCOUNTER — OFFICE VISIT (OUTPATIENT)
Dept: OPHTHALMOLOGY | Facility: CLINIC | Age: 69
End: 2024-04-16
Payer: MEDICARE

## 2024-04-16 DIAGNOSIS — H25.13 NUCLEAR SCLEROSIS OF BOTH EYES: ICD-10-CM

## 2024-04-16 DIAGNOSIS — H40.1131 CHRONIC OPEN ANGLE GLAUCOMA OF BOTH EYES, MILD STAGE: Primary | ICD-10-CM

## 2024-04-16 PROCEDURE — 99214 OFFICE O/P EST MOD 30 MIN: CPT | Mod: S$PBB,,, | Performed by: OPHTHALMOLOGY

## 2024-04-16 PROCEDURE — 99213 OFFICE O/P EST LOW 20 MIN: CPT | Mod: PBBFAC,25 | Performed by: OPHTHALMOLOGY

## 2024-04-16 PROCEDURE — 99999 PR PBB SHADOW E&M-EST. PATIENT-LVL III: CPT | Mod: PBBFAC,,, | Performed by: OPHTHALMOLOGY

## 2024-04-16 PROCEDURE — 92133 CPTRZD OPH DX IMG PST SGM ON: CPT | Mod: PBBFAC | Performed by: OPHTHALMOLOGY

## 2024-04-16 RX ORDER — TIMOLOL MALEATE 5 MG/ML
1 SOLUTION/ DROPS OPHTHALMIC EVERY MORNING
Qty: 5 ML | Refills: 12 | Status: SHIPPED | OUTPATIENT
Start: 2024-04-16

## 2024-04-16 RX ORDER — BIMATOPROST 0.3 MG/ML
1 SOLUTION/ DROPS OPHTHALMIC NIGHTLY
Qty: 2.5 ML | Refills: 12 | Status: SHIPPED | OUTPATIENT
Start: 2024-04-16

## 2024-04-16 NOTE — PROGRESS NOTES
SUBJECTIVE  Len Hicks is 68 y.o. female  Corrected distance visual acuity was 20/30 -2 in the right eye and 20/25 in the left eye.   Chief Complaint   Patient presents with    Glaucoma     Pt reports for 4m IOP GOCT. Denies any pain or irritation. Va stable. 100% compliant with gtts.           HPI     Glaucoma     Additional comments: Pt reports for 4m IOP GOCT. Denies any pain or   irritation. Va stable. 100% compliant with gtts.            Comments    1. Mild Coag (Initial 24/22) goal = 16 +Fhx (niece)  SLT OD 1/05 (20-17)  2. NSC OU  3. Dry Eyes    Timolol BID OU  Lumigan 0.03% QHS OU             Last edited by Martínez Varghese on 4/16/2024  8:31 AM.         Assessment /Plan :  1. Chronic open angle glaucoma of both eyes, mild stage Doing well, intraocular pressure (IOP) within acceptable range relative to target IOP and no evidence of progression. Continue current treatment. Reviewed importance of continued compliance with treatment and follow up.      Patient instructed to continue using the following glaucoma medication as follows:  Timolol one drop in each eye every 12 hours and Bimatoprost (Lumigan) one drop in each eye nightly    Return to clinic in 4 months  or as needed.  With Dilation and HVF 24-2     2. Nuclear sclerosis of both eyes  -- Condition stable, no therapeutic change required. Monitoring routinely.

## 2024-08-06 ENCOUNTER — OFFICE VISIT (OUTPATIENT)
Dept: OPHTHALMOLOGY | Facility: CLINIC | Age: 69
End: 2024-08-06
Payer: MEDICARE

## 2024-08-06 DIAGNOSIS — H25.13 NUCLEAR SCLEROSIS OF BOTH EYES: ICD-10-CM

## 2024-08-06 DIAGNOSIS — H40.1131 CHRONIC OPEN ANGLE GLAUCOMA OF BOTH EYES, MILD STAGE: Primary | ICD-10-CM

## 2024-08-06 PROCEDURE — 99999 PR PBB SHADOW E&M-EST. PATIENT-LVL III: CPT | Mod: PBBFAC,,, | Performed by: OPHTHALMOLOGY

## 2024-08-06 PROCEDURE — 99213 OFFICE O/P EST LOW 20 MIN: CPT | Mod: PBBFAC | Performed by: OPHTHALMOLOGY

## 2024-08-06 PROCEDURE — 92083 EXTENDED VISUAL FIELD XM: CPT | Mod: PBBFAC | Performed by: OPHTHALMOLOGY

## 2024-08-06 PROCEDURE — 99214 OFFICE O/P EST MOD 30 MIN: CPT | Mod: S$PBB,,, | Performed by: OPHTHALMOLOGY

## 2024-08-06 RX ORDER — LATANOPROST 50 UG/ML
1 SOLUTION/ DROPS OPHTHALMIC NIGHTLY
Qty: 7.5 ML | Refills: 3 | Status: SHIPPED | OUTPATIENT
Start: 2024-08-06 | End: 2025-08-06

## 2024-09-10 ENCOUNTER — OFFICE VISIT (OUTPATIENT)
Dept: OPHTHALMOLOGY | Facility: CLINIC | Age: 69
End: 2024-09-10
Payer: MEDICARE

## 2024-09-10 DIAGNOSIS — H40.1131 CHRONIC OPEN ANGLE GLAUCOMA OF BOTH EYES, MILD STAGE: Primary | ICD-10-CM

## 2024-09-10 DIAGNOSIS — H25.13 NUCLEAR SCLEROSIS OF BOTH EYES: ICD-10-CM

## 2024-09-10 PROCEDURE — 99214 OFFICE O/P EST MOD 30 MIN: CPT | Mod: S$PBB,,, | Performed by: OPHTHALMOLOGY

## 2024-09-10 PROCEDURE — 99999 PR PBB SHADOW E&M-EST. PATIENT-LVL III: CPT | Mod: PBBFAC,,, | Performed by: OPHTHALMOLOGY

## 2024-09-10 PROCEDURE — 99213 OFFICE O/P EST LOW 20 MIN: CPT | Mod: PBBFAC | Performed by: OPHTHALMOLOGY

## 2024-09-10 PROCEDURE — 92133 CPTRZD OPH DX IMG PST SGM ON: CPT | Mod: PBBFAC | Performed by: OPHTHALMOLOGY

## 2024-09-10 NOTE — PROGRESS NOTES
SUBJECTIVE  Len Hicks is 69 y.o. female  Corrected distance visual acuity was 20/30 in the right eye and 20/25 in the left eye.   Chief Complaint   Patient presents with    Glaucoma     Pt here for 6 wk Latanoprost trial. No pain or discomfort. VA stable. 100% compliant with gtts.           HPI     Glaucoma     Additional comments: Pt here for 6 wk Latanoprost trial. No pain or   discomfort. VA stable. 100% compliant with gtts.            Comments    1. Mild Coag (Initial 24/22) goal = 16 +Fhx (niece)  SLT OD 1/05 (20-17)  2. NSC OU  3. Dry Eyes    Timolol BID OU  Latanoprost QHS OU             Last edited by Marcellus Garcia MA on 9/10/2024 11:14 AM.         Assessment /Plan :  1. Chronic open angle glaucoma of both eyes, mild stage Doing well, intraocular pressure (IOP) within acceptable range relative to target IOP and no evidence of progression. Continue current treatment. Reviewed importance of continued compliance with treatment and follow up.      Patient instructed to continue using the following glaucoma medication as follows:  Latanoprost one drop in each eye nightly and Timolol one drop in each eye every 12 hours    Return to clinic in 4 months with Dr. Rincon or as needed.  With IOP Check     2. Nuclear sclerosis of both eyes - Not visually significant. Will continue to monitor.

## 2025-03-06 ENCOUNTER — OFFICE VISIT (OUTPATIENT)
Dept: OPHTHALMOLOGY | Facility: CLINIC | Age: 70
End: 2025-03-06
Payer: MEDICARE

## 2025-03-06 DIAGNOSIS — H40.1131 CHRONIC OPEN ANGLE GLAUCOMA OF BOTH EYES, MILD STAGE: Primary | ICD-10-CM

## 2025-03-06 DIAGNOSIS — H25.13 NUCLEAR SCLEROSIS OF BOTH EYES: ICD-10-CM

## 2025-03-06 PROCEDURE — 1159F MED LIST DOCD IN RCRD: CPT | Mod: CPTII,S$GLB,, | Performed by: OPHTHALMOLOGY

## 2025-03-06 PROCEDURE — 99999 PR PBB SHADOW E&M-EST. PATIENT-LVL III: CPT | Mod: PBBFAC,,, | Performed by: OPHTHALMOLOGY

## 2025-03-06 PROCEDURE — 1160F RVW MEDS BY RX/DR IN RCRD: CPT | Mod: CPTII,S$GLB,, | Performed by: OPHTHALMOLOGY

## 2025-03-06 PROCEDURE — 99214 OFFICE O/P EST MOD 30 MIN: CPT | Mod: S$GLB,,, | Performed by: OPHTHALMOLOGY

## 2025-03-06 NOTE — PROGRESS NOTES
HPI     Glaucoma     Additional comments: 4 months IOP check     Patient states no visual complaints and no ocular pain/discomfort.            Comments    Sit at the edge of the chair for IOP check  Likes ONL 9 AM  Referred her sisters Robyn Bhatti and Jennifer Mckee    1. Mild Coag (Initial 24/22) goal = 16 +Fhx (niece)  SLT OD 1/05 (20-17)  2. NSC OU  3. Dry Eyes    Timolol BID OU  Latanoprost QHS OU           Last edited by Jana Pickering on 3/6/2025  1:53 PM.            Assessment /Plan     For exam results, see Encounter Report.    Chronic open angle glaucoma of both eyes, mild stage  Doing well, intraocular pressure (IOP) within acceptable range relative to target IOP with no evidence of progression. Continue current treatment. Reviewed importance of continued compliance with treatment and follow up.      Continue current gtts:  Latanoprost one drop in each eye nightly and Timolol one drop in each eye every 12 hours    RTC in 4 months with HVF 24-2, gOCT with GC , and DFE.    Nuclear sclerosis of both eyes  Cataracts are present but not visually significant. Will continue to monitor.